# Patient Record
Sex: MALE | Race: WHITE | NOT HISPANIC OR LATINO
[De-identification: names, ages, dates, MRNs, and addresses within clinical notes are randomized per-mention and may not be internally consistent; named-entity substitution may affect disease eponyms.]

---

## 2018-02-26 ENCOUNTER — RX ONLY (RX ONLY)
Age: 73
End: 2018-02-26

## 2018-02-26 ENCOUNTER — APPOINTMENT (OUTPATIENT)
Dept: URBAN - METROPOLITAN AREA CLINIC 282 | Age: 73
Setting detail: DERMATOLOGY
End: 2018-02-28

## 2018-02-26 DIAGNOSIS — D485 NEOPLASM OF UNCERTAIN BEHAVIOR OF SKIN: ICD-10-CM

## 2018-02-26 DIAGNOSIS — R21 RASH AND OTHER NONSPECIFIC SKIN ERUPTION: ICD-10-CM

## 2018-02-26 PROBLEM — D48.5 NEOPLASM OF UNCERTAIN BEHAVIOR OF SKIN: Status: ACTIVE | Noted: 2018-02-26

## 2018-02-26 PROCEDURE — 11100: CPT

## 2018-02-26 PROCEDURE — OTHER PRESCRIPTION: OTHER

## 2018-02-26 PROCEDURE — OTHER COUNSELING: OTHER

## 2018-02-26 PROCEDURE — 99202 OFFICE O/P NEW SF 15 MIN: CPT | Mod: 25

## 2018-02-26 PROCEDURE — OTHER MIPS QUALITY: OTHER

## 2018-02-26 PROCEDURE — OTHER TREATMENT REGIMEN: OTHER

## 2018-02-26 PROCEDURE — OTHER BIOPSY BY PUNCH METHOD: OTHER

## 2018-02-26 PROCEDURE — 11101: CPT

## 2018-02-26 RX ORDER — HYDROXYZINE HYDROCHLORIDE 50 MG/1
TABLET, FILM COATED ORAL
Qty: 30 | Refills: 0 | COMMUNITY
Start: 2018-02-26

## 2018-02-26 RX ORDER — LORATADINE 10 MG/1
TABLET ORAL
Qty: 30 | Refills: 3 | COMMUNITY
Start: 2018-02-26

## 2018-02-26 RX ORDER — TRIAMCINOLONE ACETONIDE 1 MG/G
CREAM TOPICAL
Qty: 1 | Refills: 1

## 2018-02-26 ASSESSMENT — LOCATION SIMPLE DESCRIPTION DERM: LOCATION SIMPLE: RIGHT UPPER ARM

## 2018-02-26 ASSESSMENT — LOCATION ZONE DERM: LOCATION ZONE: ARM

## 2018-02-26 ASSESSMENT — LOCATION DETAILED DESCRIPTION DERM: LOCATION DETAILED: RIGHT DISTAL POSTERIOR UPPER ARM

## 2018-02-26 NOTE — PROCEDURE: BIOPSY BY PUNCH METHOD
X Size Of Lesion In Cm (Optional): 0
Anesthesia Volume In Cc (Will Not Render If 0): 0.5
Body Location Override (Optional - Billing Will Still Be Based On Selected Body Map Location If Applicable): right posterior arm inferior
Detail Level: Detailed
Billing Type: Third-Party Bill
Punch Size In Mm: 3
Patient Will Remove Sutures At Home?: No
Hemostasis: None
Body Location Override (Optional - Billing Will Still Be Based On Selected Body Map Location If Applicable): right posterior arm superior
Wound Care: Aquaphor
Notification Instructions: Patient will be notified of biopsy results. However, patient instructed to call the office if not contacted within 2 weeks.
Anesthesia Type: 1% lidocaine with epinephrine
Biopsy Type: DIF
Suture Removal: 10 days
Consent: Written consent was obtained and risks were reviewed including but not limited to scarring, infection, bleeding, scabbing, incomplete removal, nerve damage and allergy to anesthesia.
Epidermal Sutures: none, closed by secondary intention
Dressing: bandage
Home Suture Removal Text: Patient was provided a home suture removal kit and will remove their sutures at home.  If they have any questions or difficulties they will call the office.
Post-Care Instructions: I reviewed with the patient in detail post-care instructions. Patient is to keep the biopsy site dry overnight, and then apply bacitracin twice daily until healed. Patient may apply hydrogen peroxide soaks to remove any crusting.
Biopsy Type: H and E

## 2018-02-26 NOTE — PROCEDURE: MIPS QUALITY
Quality 110: Preventive Care And Screening: Influenza Immunization: Influenza Immunization Administered during Influenza season
Quality 226: Preventive Care And Screening: Tobacco Use: Screening And Cessation Intervention: Patient screened for tobacco and never smoked
Quality 111:Pneumonia Vaccination Status For Older Adults: Pneumococcal Vaccination not Administered or Previously Received, Reason not Otherwise Specified
Detail Level: Detailed
Quality 431: Preventive Care And Screening: Unhealthy Alcohol Use - Screening: Unhealthy alcohol use screening not performed, reason not otherwise specified

## 2018-02-26 NOTE — PROCEDURE: TREATMENT REGIMEN
Detail Level: Detailed
Initiate Treatment: Apply TAC 0.1% cream generously to AA BID\\nApply Sarna lotion in between TAC for itching\\nIncrease hydroxyzine to 50 mg qhs\\nAdd Claritin qam
Plan: Bx planned for today
Continue Regimen: Aveeno lotion

## 2018-02-26 NOTE — HPI: DRY SKIN
How Severe Is Your Dry Skin?: mild
Additional History: Laundry detergent changed 1 month ago \\nPatient has been using sauna  lotion and moisturizes with Aveeno lotion
How Many Showers Or Baths Do You Take In One Day?: 1

## 2018-03-13 ENCOUNTER — APPOINTMENT (OUTPATIENT)
Dept: URBAN - METROPOLITAN AREA CLINIC 282 | Age: 73
Setting detail: DERMATOLOGY
End: 2018-03-14

## 2018-03-13 DIAGNOSIS — T88.7XX: ICD-10-CM

## 2018-03-13 PROBLEM — T88.7XXA UNSPECIFIED ADVERSE EFFECT OF DRUG OR MEDICAMENT, INITIAL ENCOUNTER: Status: ACTIVE | Noted: 2018-03-13

## 2018-03-13 PROCEDURE — OTHER OTHER: OTHER

## 2018-03-13 PROCEDURE — 99213 OFFICE O/P EST LOW 20 MIN: CPT

## 2018-03-13 PROCEDURE — OTHER COUNSELING: OTHER

## 2018-03-13 PROCEDURE — OTHER PRESCRIPTION: OTHER

## 2018-03-13 PROCEDURE — OTHER TREATMENT REGIMEN: OTHER

## 2018-03-13 RX ORDER — PREDNISONE 10 MG/1
TABLET ORAL
Qty: 42 | Refills: 0 | Status: CANCELLED

## 2018-03-13 ASSESSMENT — LOCATION DETAILED DESCRIPTION DERM
LOCATION DETAILED: SUPERIOR THORACIC SPINE
LOCATION DETAILED: LEFT MEDIAL INFERIOR CHEST

## 2018-03-13 ASSESSMENT — LOCATION SIMPLE DESCRIPTION DERM
LOCATION SIMPLE: UPPER BACK
LOCATION SIMPLE: CHEST

## 2018-03-13 ASSESSMENT — LOCATION ZONE DERM: LOCATION ZONE: TRUNK

## 2018-03-13 NOTE — PROCEDURE: TREATMENT REGIMEN
Initiate Treatment: Start prednisone taper (start at 60mg + taper down to 0 over 12 days)
Detail Level: Detailed
Discontinue Regimen: Aveeno products and switch to vanicream products \\nContinue hydroxyzine 50mg at bed time \\nContinue Claritin QAM\\n\\ncontinue application of triamcinolone 0.1% cream BID to affected areas and OTC Sarna lotion for itching in between

## 2018-03-13 NOTE — PROCEDURE: OTHER
Detail Level: Detailed
Note Text (......Xxx Chief Complaint.): This diagnosis correlates with the
Other (Free Text): Pt received a printed RX of prednisone taper down. See attachments